# Patient Record
Sex: MALE | URBAN - METROPOLITAN AREA
[De-identification: names, ages, dates, MRNs, and addresses within clinical notes are randomized per-mention and may not be internally consistent; named-entity substitution may affect disease eponyms.]

---

## 2022-10-14 ENCOUNTER — TELEPHONE (OUTPATIENT)
Dept: OTHER | Facility: OTHER | Age: 57
End: 2022-10-14

## 2022-10-14 NOTE — TELEPHONE ENCOUNTER
Pt wife is calling to see if office take [employer]  Patient has Parkinson's and needs a doctor  Also looking to make an appointment   Please call back to discuss

## 2022-10-18 ENCOUNTER — TELEPHONE (OUTPATIENT)
Dept: NEUROLOGY | Facility: CLINIC | Age: 57
End: 2022-10-18

## 2022-10-18 NOTE — TELEPHONE ENCOUNTER
Spoke with patient and scheduled him an appt   With Dr Berg Members 5/10/2023 and also added him to cancellation list

## 2022-11-10 ENCOUNTER — TELEPHONE (OUTPATIENT)
Dept: NEUROLOGY | Facility: CLINIC | Age: 57
End: 2022-11-10

## 2022-11-10 NOTE — TELEPHONE ENCOUNTER
Pts wife called to confirm time and date of appt  I did see that it needed to be r/s due to a flex day for Dr Valerie Luevano  New appt is not 5/18/23 @ 2:30 in [place]  Provided Pt with information and sent link to email for [employer]

## 2023-05-18 ENCOUNTER — OFFICE VISIT (OUTPATIENT)
Dept: NEUROLOGY | Facility: CLINIC | Age: 58
End: 2023-05-18

## 2023-05-18 VITALS — DIASTOLIC BLOOD PRESSURE: 82 MMHG | SYSTOLIC BLOOD PRESSURE: 140 MMHG

## 2023-05-18 DIAGNOSIS — G20 PARKINSON DISEASE (HCC): Primary | ICD-10-CM

## 2023-05-18 PROBLEM — G20.A1 PARKINSON DISEASE: Status: ACTIVE | Noted: 2023-05-18

## 2023-05-18 RX ORDER — LISINOPRIL 40 MG/1
40 TABLET ORAL DAILY
COMMUNITY
Start: 2023-05-09

## 2023-05-18 RX ORDER — AMANTADINE HYDROCHLORIDE 100 MG/1
100 TABLET ORAL 2 TIMES DAILY
COMMUNITY
Start: 2023-04-12 | End: 2023-05-24 | Stop reason: SDUPTHER

## 2023-05-18 RX ORDER — FUROSEMIDE 40 MG/1
40 TABLET ORAL EVERY MORNING
COMMUNITY
Start: 2023-04-12

## 2023-05-18 RX ORDER — POTASSIUM CHLORIDE 750 MG/1
TABLET, FILM COATED, EXTENDED RELEASE ORAL EVERY MORNING
COMMUNITY
Start: 2023-04-12

## 2023-05-18 NOTE — PATIENT INSTRUCTIONS
Will switch Sinemet to Rytary with small increase  Take Rytary 195mg 2 caps 4 times daily  Continue amantadine 100mg with first and third dose of Rytary

## 2023-05-18 NOTE — PROGRESS NOTES
Review of Systems   Constitutional: Positive for fatigue (Always feels tired)  Negative for appetite change and fever  HENT: Negative  Negative for hearing loss, tinnitus, trouble swallowing and voice change  Eyes: Negative  Negative for photophobia, pain and visual disturbance  Respiratory: Positive for shortness of breath  Cardiovascular: Negative for palpitations  Stamina is not the same as it used to be      Gastrointestinal: Negative  Negative for nausea and vomiting  Endocrine: Negative  Negative for cold intolerance  Genitourinary: Negative  Negative for dysuria, frequency and urgency  Musculoskeletal: Positive for back pain (Lower back), gait problem (Shuffles feet every once in a while) and myalgias (Cramps in fingers)  Negative for neck pain  Balance issues depending on where he is and what he is trying to do     Skin: Negative  Negative for rash  Allergic/Immunologic: Negative  Neurological: Positive for tremors (Right hand and both feet, Right is greater than left and usually occurs at night time when in his chair), speech difficulty (Slurred speech every once in a while) and weakness (Right Arm)  Negative for dizziness, seizures, syncope, facial asymmetry, light-headedness, numbness and headaches  Hematological: Negative  Does not bruise/bleed easily  Psychiatric/Behavioral: Positive for sleep disturbance (Trouble going back to sleep after waking up to go to bathroom)  Negative for confusion and hallucinations  Making noises in sleep      All other systems reviewed and are negative

## 2023-05-18 NOTE — PROGRESS NOTES
Patient ID: Deepthi Camarena is a 59 y o  male    Assessment/Plan:    [facility] ([facility]  Asymmetric rest tremor, bradykinesia, rigidity, postural gait changes that are responsive to dopaminergic supplementation consistent with his diagnosis of Parkinson's disease  He is noting wearing off of the medications about 30 minutes prior to his next dose with return of tremor  Time spent discussing Parkinson's disease, its natural progression, development of relations  We reviewed potential treatment options in reducing off time being adding medications such as Nourianz, a COMT inhibitor such as opicapone or entacapone, or dosing Sinemet closer together  We also discussed the potential of switching the formulation of Sinemet to Rytary to reduce off time  At this point would avoid dopamine agonist with this patient given a history of lymphedema central side effect such as weight gain, impulsivity, daytime sedation, and hallucinations  He has sleep apnea for which he uses CPAP  Time also briefly spent on discussing deep brain stimulation surgery and its role in the treatment of Parkinson's disease  He is not interested at this time  Wishes to try switching his Sinemet to Rytary switch made with small increase in levodopa  Instructed to take take Rytary 195mg 2 caps 4 times daily  Continue amantadine 100mg with first and third dose of Rytary       Diagnoses and all orders for this visit:    Parkinson disease ([facility] )  -     Carbidopa-Levodopa ER 48  MG CPCR; Take 2 capsules by mouth 4 (four) times a day    Other orders  -     Amantadine HCl 100 MG tablet; 100 mg 2 (two) times a day  -     Discontinue: carbidopa-levodopa (SINEMET)  mg per tablet; 4 (four) times a day Roughly every 4 hours  -     furosemide (LASIX) 40 mg tablet; 40 mg every morning  -     lisinopril (ZESTRIL) 40 mg tablet;  Take 40 mg by mouth daily  -     potassium chloride (Klor-Con) 10 mEq tablet; every morning        Subjective:      Rashaun Jackson is a 59year old RH retired  with Parkinson's disease    He noted a right hand rest tremor at age 62  He later noted micrographia  He was noting tremor when going for qualifiers  He retired at 60 due to symptoms  In retrospect his wife and daughter noted a change in body odor about 3 years prior  They read that being a prior indication  Loss of olfaction noted many years ago which he attributed to his job as a   Maternal AMBER had tremor and  at 78  He was seen at [facility] in [place] in Dec 2019  He was diagnosed with tremor predominant PD  He was started on rasagiline 1mg, Acteylcystiene 600mg  And amantadine added  Sinemet was started and titrated with improvement in tremor  Overtime he was developing motor fluctuations  He was seen by Dr Sandy Hewitt at [facility] in [place] May 2021  LEYLA scan ordered and confirmed a dopaminergic deficiency  Recommended consultation at [facility] where was last seen  Current medications:  Amantadine 100mg qam and bedtime  Sinemet 25/100 2 tabs 4 times daily (q 4 hours apart setting timers after each dose)     Prior PD medication:  rasagiline  Acetylcysteine      He has noted return of tremor 2-3 hours after his dose  Tremor can be present when stressed  Speech is soft  Can be slurred at times but he demonstrates a change due to denture  There is no drooling  No difficulty chewing and swallowing  Dressing and hygiene acts performed independently  Gait varies  He walks overall slower and can drag his foot  He has lower back pain  He will stoop over with prolonged walking so plans on getting walking sticks  He has trouble arising out of bed  He has fallen 3 times over this past 2 years  He has sleep apnea and uses CPAP  He arises to urinate and has trouble falling back to sleep due to mind racing  There is mild daytime sedation  There are no issues with urination or constipation  There are no experiences with lightheadedness on standing  Cognition is unchanged  Wife states she notes he forgets conversations  It is unclear if this is due to him paying attention to conversations  Currently living situation is challenging as there are four generations under one roof  Np hallucinations        3:29-4;30  I have spent a total time of 61 minutes on 05/19/23 in caring for this patient including Prognosis, Risks and benefits of tx options, Instructions for management, Patient and family education, Importance of tx compliance, Risk factor reductions, Impressions, Counseling / Coordination of care, Documenting in the medical record, Reviewing / ordering tests, medicine, procedures   and Obtaining or reviewing history    Objective:    /82 (BP Location: Left arm, Patient Position: Standing, Cuff Size: Large)       Physical Exam  Vitals reviewed  Eyes:      Extraocular Movements: Extraocular movements intact  Pupils: Pupils are equal, round, and reactive to light  Neurological:      Mental Status: He is alert  Motor: Motor strength is normal          Neurological Exam  Mental Status  Alert  Oriented to person, place, time and situation  Speech: hypophonia  Language is fluent with no aphasia  Attention and concentration are normal     Cranial Nerves  CN III, IV, VI: Extraocular movements intact bilaterally  Pupils equal round and reactive to light bilaterally  CN VII: Full and symmetric facial movement  CN VIII: Hearing is normal   CN IX, X: Palate elevates symmetrically  CN XI: Shoulder shrug strength is normal   CN XII: Tongue midline without atrophy or fasciculations  Motor   Normal muscle tone  Strength is 5/5 throughout all four extremities  Sensory  Light touch is normal in upper and lower extremities       Coordination  Right: Finger-to-nose normal  Rapid alternating movement abnormality:Left: Finger-to-nose normal  Rapid alternating movement abnormality:  See motor UPDRS  Gait  Casual gait: Able to rise from chair without using arms         MDS UPDRS III                                       Time since last dose:    Speech  0   Facial Expression  2   Rigidity - Neck  1   Rigidity - Upper Extremity (R)  0   Rigidity - Upper Extremity (L)   0   Rigidity - Lower Extremity (R)  0   Rigidity - Lower Extremity (L)   0   Finger Taps (R)   2   Finger Taps (L)   2   Hand Movement (R)  1   Hand Movement (L)   0   Pronation/Supination (R)  2   Pronation/Supination (L)   1   Toe Tapping (R) 1   Toe Tapping (L) 1   Leg Agility (R)  0   Leg Agility (L)   0   Arising from Chair   0   Gait   1   Freezing of Gait 0   Postural Stability   0   Posture 1   Global spontaneity of movement 1   Postural Tremor (Ri 0   Postural Tremor (L) 0   Kinetic Tremor (R)  0   Kinetic Tremor (L)  0   Rest tremor amplitude RUE 1   Rest tremor amplitude LUE 0   Rest tremor amplitude RLE 0   Reset tremor amplitude LLE 0   Lip/Jaw Tremor  0   Consistency of tremor 2   Motor Exam Total:                   Jeremy Jones MD  Movement disorder physician  [address] [place] [address]

## 2023-05-23 ENCOUNTER — TELEPHONE (OUTPATIENT)
Dept: NEUROLOGY | Facility: CLINIC | Age: 58
End: 2023-05-23

## 2023-05-23 NOTE — TELEPHONE ENCOUNTER
----- Message from [facility] sent at 5/23/2023 11:51 AM EDT -----  Regarding: Prescription renewal for ammantadine  Contact: [phone]  Dr. Evans Guess    I needed a new prescription for my ammantadine also.   Can you send that [facility] for my 90 day supply please    Thank you     Isaías Ball

## 2023-05-24 DIAGNOSIS — G20 PARKINSON DISEASE: Primary | ICD-10-CM

## 2023-05-24 RX ORDER — AMANTADINE HYDROCHLORIDE 100 MG/1
100 TABLET ORAL 2 TIMES DAILY
Qty: 180 TABLET | Refills: 2 | Status: SHIPPED | OUTPATIENT
Start: 2023-05-24

## 2023-06-20 ENCOUNTER — TELEMEDICINE (OUTPATIENT)
Dept: NEUROLOGY | Facility: CLINIC | Age: 58
End: 2023-06-20
Payer: COMMERCIAL

## 2023-06-20 DIAGNOSIS — G20 PARKINSON DISEASE (HCC): Primary | ICD-10-CM

## 2023-06-20 PROCEDURE — 99213 OFFICE O/P EST LOW 20 MIN: CPT | Performed by: PSYCHIATRY & NEUROLOGY

## 2023-06-20 NOTE — ASSESSMENT & PLAN NOTE
Predominant Parkinson's disease with motor fluctuations  There has been a significant reduction in off time with a reduced frequency and overall tremor from the change from carbidopa/levodopa to Rytary along with moving his second dose of amantadine to earlier in the day  Overall he is tolerating these changes  He is satisfied with current effect of medications on his parkinsonian symptoms  Overall functioning well  He will continue on Rytary 195 mg 2 tablets 4 times daily along with amantadine in the morning and afternoon  Contact the office prior to follow-up should he develop any issues  Encouraged to remain physically active

## 2023-06-20 NOTE — PROGRESS NOTES
Virtual Regular Visit    Verification of patient location:    Patient is located at Home in the following state in which I hold an active license [place]      Assessment/Plan:    Problem List Items Addressed This Visit        Nervous and Auditory    Parkinson disease ([facility] ) - Primary     Predominant Parkinson's disease with motor fluctuations  There has been a significant reduction in off time with a reduced frequency and overall tremor from the change from carbidopa/levodopa to Rytary along with moving his second dose of amantadine to earlier in the day  Overall he is tolerating these changes  He is satisfied with current effect of medications on his parkinsonian symptoms  Overall functioning well  He will continue on Rytary 195 mg 2 tablets 4 times daily along with amantadine in the morning and afternoon  Contact the office prior to follow-up should he develop any issues  Encouraged to remain physically active  Reason for visit is follow up for PD with tremor  Chief Complaint   Patient presents with   • Virtual Regular Visit        Encounter provider David Benitez MD    Provider located at [address] [place] [address], [address]  [address] [address] [phone]      Recent Visits  No visits were found meeting these conditions  Showing recent visits within past 7 days and meeting all other requirements  Today's Visits  Date Type Provider Dept   06/20/23 Telemedicine David Benitez MD  Neuro [facility]   Showing today's visits and meeting all other requirements  Future Appointments  No visits were found meeting these conditions  Showing future appointments within next 150 days and meeting all other requirements       The patient was identified by name and date of birth  Pasquale Sandhu was informed that this is a telemedicine visit and that the visit is being conducted through the [facility]   He agrees to proceed     My office door was closed  No ones else in room  He acknowledged consent and understanding of privacy and security of the video platform  The patient has agreed to participate and understands they can discontinue the visit at any time  Patient is aware this is a billable service  Subjective  Byron Sharif is a 59 y o  male       RH retired  with sleep apnea uses CPAP and Parkinson's disease who presents for follow up  Right hand rest tremor noted at age 62 with later development of micrographia  He retired at 60 due to symptoms (tremor at [employer])  Initial history at end of note  Initial seen in our office 5/2023 with wearing off 30 minutes prior to his next dose noted by return of tremor  Medication and surgical options reviewed  Opted to switch to Rytary  Doing well since the switch to Rytary and altering of the timing of the amantadine   He feels overall less off   With return of tremor   He does experience mild breakthrough tremor which is less predictable   More common when stressed   The most part medications appears to be lasting at 4 hours most days   On occasion tremor will return at 3 hours   Overall sleeping well   He denies any side effects of the medication   There are no hallucinations or lightheadedness   Gait is unchanged   He continues to be active around the home  Current PD medications:  Rytary 195mg 2 caps 4 times daily  Amantadine 100mg with first and third dose of Rytary     Prior PD medication:  rasagiline  Acetylcysteine? Sinemet 25/100 2 tabs 4 times daily (q 4 hours apart setting timers after each dose)         Initial history:  Wife and daughter noted a change in body odor at 46   Loss of olfaction noted many years ago which he attributed to his job  Dx with PD at [facility] in [place] in Dec 2019  Started on rasagiline 1mg, Acteylcystiene 600mg and amantadine added  Sinemet was started and titrated with improvement in tremor   Overtime he was developing motor fluctuations  He was seen by Dr Kojo Mitchell at [facility] in [place] May 2021  LEYLA scan ordered and confirmed a dopaminergic deficiency  Recommended [facility] at [facility]  History reviewed  No pertinent past medical history  History reviewed  No pertinent surgical history  Current Outpatient Medications   Medication Sig Dispense Refill   • Amantadine HCl 100 MG tablet Take 1 tablet (100 mg total) by mouth 2 (two) times a day 180 tablet 2   • Carbidopa-Levodopa ER 48  MG CPCR Take 2 capsules by mouth 4 (four) times a day 240 capsule 1   • furosemide (LASIX) 40 mg tablet 40 mg every morning     • lisinopril (ZESTRIL) 40 mg tablet Take 40 mg by mouth daily     • potassium chloride (Klor-Con) 10 mEq tablet every morning       No current facility-administered medications for this visit  No Known Allergies    Review of Systems   Constitutional: Negative  Negative for appetite change and fever  HENT: Negative  Negative for hearing loss, tinnitus, trouble swallowing and voice change  Eyes: Negative  Negative for photophobia, pain and visual disturbance  Respiratory: Negative  Negative for shortness of breath  Cardiovascular: Negative  Negative for palpitations  Gastrointestinal: Negative  Negative for nausea and vomiting  Endocrine: Negative  Negative for cold intolerance  Genitourinary: Negative  Negative for dysuria, frequency and urgency  Musculoskeletal: Positive for myalgias (Cramps in legs at times) and neck stiffness  Negative for gait problem and neck pain  Skin: Negative  Negative for rash  Allergic/Immunologic: Negative  Neurological: Positive for tremors (Right Hand, has goteen a little better since med change)  Negative for dizziness, seizures, syncope, facial asymmetry, speech difficulty, weakness, light-headedness, numbness and headaches  Hematological: Negative  Does not bruise/bleed easily  Psychiatric/Behavioral: Negative  Negative for confusion, hallucinations and sleep disturbance  All other systems reviewed and are negative  Video Exam    There were no vitals filed for this visit  Physical Exam   Mental status: Patient is awake, alert and oriented x 4, Follows commands  Normal speech  Cranial Nerves:   CN III-VI- extra ocular muscles intact   CN VII- symmetric facial movements  CN VIII- normal to voice   CN IX- symmetric shoulder shrug   CN XII- midline tongue   Coordination: No tremor with hands outstretched  No intentional tremor on nose to extended arm bilaterally  Mild decrement on finger taps bilaterally 1, 1, hand  r1,L0, PENNIE 1,0  No rest tremor noted on exam from what could be seen in the video  Able to stand easily and shift  Visit Time  Total Visit Duration: 10min  Additional 5 minutes spent on review of chart and documentation

## 2023-06-22 DIAGNOSIS — G20 PARKINSON DISEASE (HCC): ICD-10-CM

## 2023-07-03 NOTE — TELEPHONE ENCOUNTER
Patient of Dr. Mimi Gaona, last visit 6/20    Please sign script to redirect if in agreement thank you.

## 2023-07-13 ENCOUNTER — TELEPHONE (OUTPATIENT)
Dept: NEUROLOGY | Facility: CLINIC | Age: 58
End: 2023-07-13

## 2023-07-13 DIAGNOSIS — G20 PARKINSON DISEASE: ICD-10-CM

## 2023-07-13 DIAGNOSIS — G20 PARKINSON DISEASE (HCC): ICD-10-CM

## 2023-07-13 NOTE — TELEPHONE ENCOUNTER
Recd vm 7/12 taken off 7/13   my name is Brandon Sandhu, , i'm a patient of Dr. Petra Coleman and I requested a 90 day prescription for rytary that she prescribed to me the 195 milligram capsules and [facility], where I get the 90 day supply has not received her prescription for that. If someone could double check because I am just about out of the, the tablets or the capsules. And I need to have a refill immediately. Thank you.    [phone]

## 2023-07-13 NOTE — TELEPHONE ENCOUNTER
Patient left voicemail - [facility] did not receive refill for rytary. Requesting new script to pharmacy. Takes 8 capsules per day and almost out of current supply.

## 2023-07-17 NOTE — TELEPHONE ENCOUNTER
called patient to advise script was sent and received by [facility]; reached vm, left detailed message.

## 2023-08-11 ENCOUNTER — TELEPHONE (OUTPATIENT)
Dept: NEUROLOGY | Facility: CLINIC | Age: 58
End: 2023-08-11

## 2023-08-11 NOTE — TELEPHONE ENCOUNTER
Hi, good morning. This is [address]. I'm calling in reference to my , Kami De La Paz date of birth [birth year]. I was hoping to speak with someone regarding increased in tremors. He is a patient of Dr. Jessica Mckeon. So if one of the nurses would be available or text that I could talk with them today, My number again is [phone]. Thank you. Called [phone], spoke to pt's wife Ruthie Avalos and states that pt had a visit w/ Dr Aids Coleman and there were some med changes. In the last 3-4 weeks, pt had noticed increased in tremors. Pt takes rytary 195 mg 2 tabs qid and [id number] mg bid.     See mychart message

## 2023-08-16 NOTE — TELEPHONE ENCOUNTER
received vm from 8/15 at 5:11pm-hi, good evening, it's 5:08. I would think you're probably closed for the evening. I'm looking to hopefully speak to at least one of the nurses is possible tomorrow. This is Tuesday evening. I'm looking hopefully speak with someone wednesday. My  has been trying to use the portal, send a message, I have called and spoke with someone on Friday. And this has been going on about at least a week to 10 days that we've been trying to get ahold of someone down there. My husbands name is José Miguel schwartz, he's a patient of Dr Charito Herring in neurology, we saw her at the [facility] office and she told us to call if we had questions or concerns about his medication that she made changes on and we do. That's why we're trying to get ahold of someone. His tremors have increased over the last like say 4 or 5 weeks. And we wanted to know about adjusting the medication if its possible. So if someone could please give me a call and let me know if  Maybe dr Charito Herring is on vacation or she doesn't have time to address the issue. just to give us some heads up on what's going on and why we haven't heard anything back. We would appreciate it. You can reach me at [phone]. My name is Kassi, my husbands name again is Guillermo Pearl date of birth. [birth year]. His number is [phone]. And you can go ahead and send it via portal also and if you've already done that, I apologize for saying all this and being winded so have a good evening. Thank you.  ---------------------------------------------------------  Called and spoke to pt's wife. Advised that pt sent [employer] message on 8/10 which was forwarded to dr Charito Herring on 8/14 and she spoke to Trent on 8/11. States that pt has Increased tremor on right side, she sees hand and arm tremors. Tremor can happen any time of the day.    rytary 195mg 2 tabs QID-she is unsure of the times that he takes it  Amantadine 100mg bid    She [facility] that we call pt back [facility]. Advised that dr Lizet Miller is out of the office but would have covering providers review message.     Please also see pt's mychart message from 8/10  Please advise

## 2023-08-24 NOTE — TELEPHONE ENCOUNTER
Isa Holbrook is out of the office today-it sounds more like wearing off. In this instance we can either switch timing of his rytary or increase the frequency if this is occurring with every dose. We could also consider adding on a medication for wearing off such as opicapone or selegiline.  Cc'ing Dr. Chico Pugh as well since she has seen this patient

## 2023-09-13 ENCOUNTER — DOCUMENTATION (OUTPATIENT)
Dept: NEUROLOGY | Facility: CLINIC | Age: 58
End: 2023-09-13

## 2023-09-13 DIAGNOSIS — G20 PARKINSON DISEASE: Primary | ICD-10-CM

## 2023-09-13 RX ORDER — LEVODOPA AND CARBIDOPA 245; 61.25 MG/1; MG/1
490 CAPSULE, EXTENDED RELEASE ORAL 4 TIMES DAILY
Qty: 240 CAPSULE | Refills: 8 | Status: SHIPPED | OUTPATIENT
Start: 2023-09-13

## 2023-11-01 ENCOUNTER — OFFICE VISIT (OUTPATIENT)
Dept: NEUROLOGY | Facility: CLINIC | Age: 58
End: 2023-11-01
Payer: COMMERCIAL

## 2023-11-01 VITALS — DIASTOLIC BLOOD PRESSURE: 82 MMHG | SYSTOLIC BLOOD PRESSURE: 138 MMHG

## 2023-11-01 DIAGNOSIS — G20.A1 PARKINSON DISEASE: ICD-10-CM

## 2023-11-01 DIAGNOSIS — G20.A2 PARKINSON'S DISEASE WITHOUT DYSKINESIA, WITH FLUCTUATING MANIFESTATIONS: Primary | ICD-10-CM

## 2023-11-01 PROCEDURE — 99214 OFFICE O/P EST MOD 30 MIN: CPT | Performed by: PSYCHIATRY & NEUROLOGY

## 2023-11-01 RX ORDER — AMANTADINE HYDROCHLORIDE 100 MG/1
100 TABLET ORAL 2 TIMES DAILY
Qty: 180 TABLET | Refills: 2 | Status: SHIPPED | OUTPATIENT
Start: 2023-11-01

## 2023-11-01 RX ORDER — SELEGILINE HYDROCHLORIDE 5 MG/1
5 CAPSULE ORAL
Qty: 60 CAPSULE | Refills: 3
Start: 2023-11-01 | End: 2023-11-01 | Stop reason: SDUPTHER

## 2023-11-01 RX ORDER — AMOXICILLIN AND CLAVULANATE POTASSIUM 875; 125 MG/1; MG/1
875 TABLET, FILM COATED ORAL 2 TIMES DAILY
COMMUNITY
Start: 2023-10-29 | End: 2023-11-05

## 2023-11-01 RX ORDER — SELEGILINE HYDROCHLORIDE 5 MG/1
5 CAPSULE ORAL
Qty: 60 CAPSULE | Refills: 3 | Status: SHIPPED | OUTPATIENT
Start: 2023-11-01 | End: 2023-11-02 | Stop reason: SDUPTHER

## 2023-11-01 NOTE — PROGRESS NOTES
Patient ID: Leatha Hardy is a 59 y.o. male. Assessment/Plan:    No problem-specific Assessment & Plan notes found for this encounter. Diagnoses and all orders for this visit:    Parkinson's disease without dyskinesia, with fluctuating manifestations  -     Ambulatory Referral to Physical Therapy; Future    Other orders  -     amoxicillin-clavulanate (AUGMENTIN) 875-125 mg per tablet; Take 875 mg by mouth 2 (two) times a day           Subjective:    Barber Sandhu is a RH retired  with sleep apnea uses CPAP and Parkinson's disease who presents for follow up. Right hand rest tremor noted at age 62 with later development of micrographia. He retired at 60 due to symptoms (tremor at [employer]). Initial history at end of note. Initial seen in our office 5/2023 with wearing off 30 minutes prior to his next dose noted by return of tremor. Medication and surgical options reviewed. Opted to switch to Rytary. He has noted increased frequency of tremor of the right hand. It improve for about an hour after medication and then returns. Overall has increase in fatigue. Per his wife he constantly yawns and can fall asleep easily when he sits during the day. He sleeps well with CPAP at night and feels well rested. New mask has helped. He awakens at 2:30 am but is able to fall right back to sleep. Occasional mild daytime sedation. He has good and bad days. Some days he drives slow and other times he is more quick. Short term memory is affected. He will forget conversations or tasks (what child to , etc.) He started having them send text messages and he is putting them on a post it which has helped. He has neck stiffness and is stiff in the am. He is interested in seeing a chiropractor to see if they may help with his posture and leaning. Speech is soft. There is no drooling. No difficulty chewing and swallowing. Dressing independently.   Hygiene acts performed independently without difficulty      Current PD medications:  Rytary 245mg 2 caps 4 times daily  Amantadine 100mg with first and third dose of Rytary     Prior PD medication:  Rasagiline- sleepy and feeling like he is not there   Acetylcysteine? Sinemet 25/100 2 tabs 4 times daily (q 4 hours apart setting timers after each dose)         Initial history:  Wife and daughter noted a change in body odor at 46. Loss of olfaction noted many years ago which he attributed to his job. Dx with PD at [facility] in [place] in Dec 2019. Started on rasagiline 1mg, Acteylcystiene 600mg and amantadine added. Sinemet was started and titrated with improvement in tremor. Overtime he was developing motor fluctuations. He was seen by Dr. Richie Hawk at [facility] in [place] May 2021. LEYLA scan ordered and confirmed a dopaminergic deficiency. Recommended consultation at [facility]. Objective:    Blood pressure 138/82. Physical Exam  Vitals reviewed. Eyes:      Extraocular Movements: Extraocular movements intact. Pupils: Pupils are equal, round, and reactive to light. Neurological:      Mental Status: He is alert. Motor: Motor strength is normal.        Neurological Exam  Mental Status  Alert. Oriented to person, place, time and situation. Speech: hypophonia. Language is fluent with no aphasia. Attention and concentration are normal.    Cranial Nerves  CN III, IV, VI: Extraocular movements intact bilaterally. Pupils equal round and reactive to light bilaterally. CN VII: Full and symmetric facial movement. CN IX, X: Palate elevates symmetrically  CN XI: Shoulder shrug strength is normal.  CN XII: Tongue midline without atrophy or fasciculations. Motor   Normal muscle tone. Strength is 5/5 throughout all four extremities. Coordination  Left: Finger-to-nose normal.  See motor UPDRS. Gait   Able to rise from chair without using arms.       MDS UPDRS III                                     11/1/23   Time since last dose:   3 hours    Speech  0 0   Facial Expression  2 2   Rigidity - Neck  1 1   Rigidity - Upper Extremity (R)  0 1   Rigidity - Upper Extremity (L)   0 0   Rigidity - Lower Extremity (R)  0 0   Rigidity - Lower Extremity (L)   0 0   Finger Taps (R)   2 2   Finger Taps (L)   2 2   Hand Movement (R)  1 1   Hand Movement (L)   0 0   Pronation/Supination (R)  2 2   Pronation/Supination (L)   1 1   Toe Tapping (R) 1 1   Toe Tapping (L) 1 0   Leg Agility (R)  0 0   Leg Agility (L)   0 0   Arising from Chair   0 0   Gait   1 1   Freezing of Gait 0 0   Postural Stability   0 0   Posture 1 2   Global spontaneity of movement 1 1   Postural Tremor (Ri 0 0   Postural Tremor (L) 0 0   Kinetic Tremor (R)  0 0   Kinetic Tremor (L)  0 0   Rest tremor amplitude RUE 1 2   Rest tremor amplitude LUE 0 0   Rest tremor amplitude RLE 0 0   Reset tremor amplitude LLE 0 0   Lip/Jaw Tremor  0 0   Consistency of tremor 2 2   Motor Exam Total:    21           ROS:    Review of Systems   Constitutional:  Positive for fatigue. Negative for appetite change and fever. HENT: Negative. Negative for hearing loss, tinnitus, trouble swallowing and voice change. Eyes: Negative. Negative for photophobia, pain and visual disturbance. Respiratory: Negative. Negative for shortness of breath. Cardiovascular: Negative. Negative for palpitations. Gastrointestinal: Negative. Negative for nausea and vomiting. Endocrine: Negative. Negative for cold intolerance. Genitourinary: Negative. Negative for dysuria, frequency and urgency. Musculoskeletal:  Positive for gait problem (Every once in a while shuffles feet) and neck stiffness. Negative for back pain, myalgias and neck pain. Balance Issues at times, has stayed the same   Skin: Negative. Negative for rash. Allergic/Immunologic: Negative. Neurological:  Positive for tremors (Right Hand, has gotten worse) and speech difficulty ([place]).  Negative for dizziness, seizures, syncope, facial asymmetry, weakness, light-headedness, numbness and headaches. Hematological: Negative. Does not bruise/bleed easily. Psychiatric/Behavioral: Negative. Negative for confusion, hallucinations and sleep disturbance. All other systems reviewed and are negative.

## 2023-11-01 NOTE — PATIENT INSTRUCTIONS
Tremor predominant Parkinson's disease with wearing off of medications with return of tremor within an hour or two of the dose. Other symptoms are fairly stable. H&Y stage 2. He does have daytime sedation, an over zealous appetite and leg edema so we will avoid dopamine agonist. Other options for wearing off reviewed including:  Adding selegiline or Nourianz or a COMT - inhibitor. We opted to continue  Rytary 61. mg 2 tablets 4 times daily (q 4 hours  apart) along with amantadine in the morning and afternoon.   Will add selegiline 5mg qam and afternoon

## 2023-11-02 DIAGNOSIS — G20.A2 PARKINSON'S DISEASE WITHOUT DYSKINESIA, WITH FLUCTUATING MANIFESTATIONS: ICD-10-CM

## 2023-11-02 RX ORDER — SELEGILINE HYDROCHLORIDE 5 MG/1
5 CAPSULE ORAL
Qty: 180 CAPSULE | Refills: 1 | Status: SHIPPED | OUTPATIENT
Start: 2023-11-02

## 2023-11-02 NOTE — TELEPHONE ENCOUNTER
Received the following Patient Advice Request:    Ny Sandhu   to [facility] [address] Team 4 (supporting Taya Varner MD)   RD      11/1/23  7:23 PM  Good evening- on our way home from the appointment  we were contacted by our local [facility], they are not able to get the newest medication in that you added today. Can you please go ahead and send that also to my mail order [facility] for a 90 day supply. That way we should be able to get vs trying to find at another pharmacy.   Thank you  Ludwig Roth

## 2023-12-04 ENCOUNTER — PATIENT MESSAGE (OUTPATIENT)
Dept: NEUROLOGY | Facility: CLINIC | Age: 58
End: 2023-12-04

## 2023-12-05 NOTE — PATIENT COMMUNICATION
Received VM transcription from 12/4/23, 3:19 PM ([phone]):    Yes, my name is José Miguel Esparza. I am a patient of Dr. Greene and I have a quick question. I am experiencing a lot of a major side effects with the Selegiline that she put me on with the amantadine and Rytary. I need to speak to someone about dosing out of that so I can stop taking it. Like I said, I'm having major side effects; I'm falling asleep while driving, severe aggravative ability, you name it I'm experiencing it. Constant nausea, constant dizziness, I bend over, almost feel like I'm going to pass out when I stand up. Several other different things. If someone could give me a call to tell me what I need to do to start reducing the dose to get off of it, I would appreciate it and I can talk to her about my next appointment. Thank you.

## 2023-12-06 ENCOUNTER — TELEPHONE (OUTPATIENT)
Dept: NEUROLOGY | Facility: CLINIC | Age: 58
End: 2023-12-06

## 2023-12-12 DIAGNOSIS — G20.A2 PARKINSON'S DISEASE WITHOUT DYSKINESIA, WITH FLUCTUATING MANIFESTATIONS: Primary | ICD-10-CM

## 2023-12-21 ENCOUNTER — TELEPHONE (OUTPATIENT)
Dept: NEUROLOGY | Facility: CLINIC | Age: 58
End: 2023-12-21

## 2023-12-21 DIAGNOSIS — G20.A2 PARKINSON'S DISEASE WITHOUT DYSKINESIA, WITH FLUCTUATING MANIFESTATIONS: Primary | ICD-10-CM

## 2023-12-21 RX ORDER — ENTACAPONE 200 MG/1
200 TABLET ORAL 4 TIMES DAILY
Qty: 120 TABLET | Refills: 8 | Status: SHIPPED | OUTPATIENT
Start: 2023-12-21

## 2023-12-21 NOTE — PATIENT COMMUNICATION
Kathryn Benitez MD   to [facility] Team 4       12/21/23  9:10 AM   I would like to avoid dopamine agonist (pramipexole, ropinirole, Neupro) given he recently had issues with falling asleep while driving/ daytime sedation and this is a known potential side effect.  We can try entacapone.  If agreeable script for entacapone 200mg 1 po 4 times daily (taken with carbidopa/leovdopa) can be sent to pharmacy.

## 2023-12-21 NOTE — PATIENT COMMUNICATION
Spoke with pt. He is agreeable to starting entacapone. Routed to provider to request that it be sent to the pharmacy.

## 2023-12-21 NOTE — PATIENT COMMUNICATION
Prior auth denied. Denied for the following reasons:        _____________________________________________________________________________    Pt must have tried at least 3 formulary alternatives. Documentation was provided that pt has tried selegiline and rasagline. Routed to provider to advise regarding next steps. Other formulary alternatives are entacapone, pramipexole, ropinirole, Neupro.

## 2024-04-30 ENCOUNTER — TELEPHONE (OUTPATIENT)
Dept: NEUROLOGY | Facility: CLINIC | Age: 59
End: 2024-04-30

## 2024-04-30 NOTE — TELEPHONE ENCOUNTER
Called patient and asked if he would like to reschedule his cancelled appt with Dr. Greene. He declined and stated that he will be seeing the [facility] Neurology team now.